# Patient Record
Sex: FEMALE | Race: WHITE | NOT HISPANIC OR LATINO | URBAN - METROPOLITAN AREA
[De-identification: names, ages, dates, MRNs, and addresses within clinical notes are randomized per-mention and may not be internally consistent; named-entity substitution may affect disease eponyms.]

---

## 2017-09-11 ENCOUNTER — OUTPATIENT (OUTPATIENT)
Dept: OUTPATIENT SERVICES | Facility: HOSPITAL | Age: 62
LOS: 1 days | End: 2017-09-11
Payer: COMMERCIAL

## 2017-09-11 VITALS
OXYGEN SATURATION: 100 % | TEMPERATURE: 98 F | RESPIRATION RATE: 16 BRPM | WEIGHT: 134.04 LBS | HEIGHT: 62.5 IN | HEART RATE: 63 BPM

## 2017-09-11 DIAGNOSIS — M20.12 HALLUX VALGUS (ACQUIRED), LEFT FOOT: ICD-10-CM

## 2017-09-11 DIAGNOSIS — Z01.818 ENCOUNTER FOR OTHER PREPROCEDURAL EXAMINATION: ICD-10-CM

## 2017-09-11 DIAGNOSIS — Z98.890 OTHER SPECIFIED POSTPROCEDURAL STATES: Chronic | ICD-10-CM

## 2017-09-11 DIAGNOSIS — G56.03 CARPAL TUNNEL SYNDROME, BILATERAL UPPER LIMBS: Chronic | ICD-10-CM

## 2017-09-11 PROCEDURE — 73630 X-RAY EXAM OF FOOT: CPT

## 2017-09-11 PROCEDURE — 73630 X-RAY EXAM OF FOOT: CPT | Mod: 26,LT

## 2017-09-11 PROCEDURE — G0463: CPT

## 2017-09-11 RX ORDER — SODIUM CHLORIDE 9 MG/ML
3 INJECTION INTRAMUSCULAR; INTRAVENOUS; SUBCUTANEOUS EVERY 8 HOURS
Qty: 0 | Refills: 0 | Status: DISCONTINUED | OUTPATIENT
Start: 2017-09-25 | End: 2017-09-25

## 2017-09-11 NOTE — H&P PST ADULT - NSANTHOSAYNRD_GEN_A_CORE
No. KAYLENE screening performed.  STOP BANG Legend: 0-2 = LOW Risk; 3-4 = INTERMEDIATE Risk; 5-8 = HIGH Risk

## 2017-09-11 NOTE — H&P PST ADULT - PROBLEM SELECTOR PLAN 1
Scheduled for left foot Roman Bunionectomy with Screw on 9/25/2017. Preoperative instructions discussed and given to patient. Verbalized understanding of instructions

## 2017-09-11 NOTE — H&P PST ADULT - MUSCULOSKELETAL COMMENTS
Left great toe bunion, non-inf lammed-appearing, left great toe deformity, mild erythema left great toe

## 2017-09-11 NOTE — H&P PST ADULT - NEGATIVE GENERAL SYMPTOMS
no chills/no sweating/no weight loss/no polydipsia/no polyuria/no malaise/no fatigue/no fever/no anorexia

## 2017-09-11 NOTE — H&P PST ADULT - PSH
None Carpal tunnel syndrome on both sides  s/p surgery in repair in 1994 and 1995  S/P bunionectomy  right foot  S/P shoulder surgery  right shoulder

## 2017-09-11 NOTE — H&P PST ADULT - ASSESSMENT
60 y/o female with PMH of heavy tobacco use, continuous diagnosed with hallux valgus, left foot and scheduled for left foot Roman Bunionectomy with Screw on 9/25/2017. Patient is at low risk for planned procedure

## 2017-09-11 NOTE — H&P PST ADULT - NEGATIVE GASTROINTESTINAL SYMPTOMS
no flatulence/no nausea/no diarrhea/no change in bowel habits/no vomiting/no constipation/no abdominal pain

## 2017-09-11 NOTE — H&P PST ADULT - FAMILY HISTORY
Father  Still living? No  Family history of oral cancer, Age at diagnosis: Age Unknown     Mother  Still living? No  Family history of myocardial infarction, Age at diagnosis: Age Unknown

## 2017-09-11 NOTE — H&P PST ADULT - RS GEN PE MLT RESP DETAILS PC
normal/no subcutaneous emphysema/good air movement/breath sounds equal/clear to auscultation bilaterally/no intercostal retractions/no rales/no wheezes/respirations non-labored/airway patent/no rhonchi/no chest wall tenderness

## 2017-09-11 NOTE — H&P PST ADULT - NEGATIVE CARDIOVASCULAR SYMPTOMS
no paroxysmal nocturnal dyspnea/no claudication/no chest pain/no orthopnea/no peripheral edema/no palpitations/no dyspnea on exertion

## 2017-09-11 NOTE — H&P PST ADULT - HISTORY OF PRESENT ILLNESS
62 y/o female with PMH of heavy tobacco use, continuous is here today for presurgical evaluation prior to surgery. She was diagnosed with hallux valgus, left foot and is scheduled for left foot Roman Bunionectomy with Screw on 9/25/2017

## 2017-09-25 ENCOUNTER — OUTPATIENT (OUTPATIENT)
Dept: OUTPATIENT SERVICES | Facility: HOSPITAL | Age: 62
LOS: 1 days | Discharge: HOME | End: 2017-09-25
Payer: COMMERCIAL

## 2017-09-25 VITALS
DIASTOLIC BLOOD PRESSURE: 68 MMHG | HEART RATE: 56 BPM | WEIGHT: 134.04 LBS | RESPIRATION RATE: 14 BRPM | HEIGHT: 62.5 IN | TEMPERATURE: 98 F | SYSTOLIC BLOOD PRESSURE: 123 MMHG | OXYGEN SATURATION: 100 %

## 2017-09-25 VITALS
HEART RATE: 55 BPM | SYSTOLIC BLOOD PRESSURE: 149 MMHG | DIASTOLIC BLOOD PRESSURE: 65 MMHG | OXYGEN SATURATION: 99 % | RESPIRATION RATE: 17 BRPM | TEMPERATURE: 98 F

## 2017-09-25 DIAGNOSIS — Z98.890 OTHER SPECIFIED POSTPROCEDURAL STATES: Chronic | ICD-10-CM

## 2017-09-25 DIAGNOSIS — Z01.818 ENCOUNTER FOR OTHER PREPROCEDURAL EXAMINATION: ICD-10-CM

## 2017-09-25 DIAGNOSIS — G56.03 CARPAL TUNNEL SYNDROME, BILATERAL UPPER LIMBS: Chronic | ICD-10-CM

## 2017-09-25 DIAGNOSIS — M20.12 HALLUX VALGUS (ACQUIRED), LEFT FOOT: ICD-10-CM

## 2017-09-25 PROCEDURE — 88300 SURGICAL PATH GROSS: CPT | Mod: 26

## 2017-09-25 PROCEDURE — 73630 X-RAY EXAM OF FOOT: CPT | Mod: 26,LT

## 2017-09-25 PROCEDURE — C1713: CPT

## 2017-09-25 PROCEDURE — 88300 SURGICAL PATH GROSS: CPT

## 2017-09-25 PROCEDURE — 28296 COR HLX VLGS DSTL MTAR OSTEO: CPT | Mod: LT

## 2017-09-25 PROCEDURE — 73630 X-RAY EXAM OF FOOT: CPT

## 2017-09-25 RX ORDER — AZITHROMYCIN 500 MG/1
1 TABLET, FILM COATED ORAL
Qty: 6 | Refills: 0 | OUTPATIENT
Start: 2017-09-25 | End: 2017-09-30

## 2017-09-25 RX ORDER — OXYCODONE AND ACETAMINOPHEN 5; 325 MG/1; MG/1
1 TABLET ORAL ONCE
Qty: 0 | Refills: 0 | Status: DISCONTINUED | OUTPATIENT
Start: 2017-09-25 | End: 2017-09-25

## 2017-09-25 RX ORDER — IBUPROFEN 200 MG
1 TABLET ORAL
Qty: 40 | Refills: 0 | OUTPATIENT
Start: 2017-09-25 | End: 2017-10-05

## 2017-09-25 RX ADMIN — SODIUM CHLORIDE 3 MILLILITER(S): 9 INJECTION INTRAMUSCULAR; INTRAVENOUS; SUBCUTANEOUS at 07:02

## 2017-09-25 NOTE — ASU DISCHARGE PLAN (ADULT/PEDIATRIC). - MEDICATION SUMMARY - MEDICATIONS TO TAKE
I will START or STAY ON the medications listed below when I get home from the hospital:    ibuprofen 800 mg oral tablet  -- 1 tab(s) by mouth every 6 hours MDD:please take no more than 4 tabs per day  -- Do not take this drug if you are pregnant.  It is very important that you take or use this exactly as directed.  Do not skip doses or discontinue unless directed by your doctor.  May cause drowsiness or dizziness.  Obtain medical advice before taking any non-prescription drugs as some may affect the action of this medication.  Take with food or milk.    -- Indication: For prn inflammation    Percocet 5/325 oral tablet  -- 1 tab(s) by mouth every 6 hours MDD:please take no more than 3 tabs per day  -- Caution federal law prohibits the transfer of this drug to any person other  than the person for whom it was prescribed.  May cause drowsiness.  Alcohol may intensify this effect.  Use care when operating dangerous machinery.  This prescription cannot be refilled.  This product contains acetaminophen.  Do not use  with any other product containing acetaminophen to prevent possible liver damage.  Using more of this medication than prescribed may cause serious breathing problems.    -- Indication: For prn pain    Azithromycin 5 Day Dose Pack 250 mg oral tablet  -- 1 tab(s) by mouth once a day MDD:take two tabs first day; 1 tab each day for the next 4 days  -- Do not take dairy products, antacids, or iron preparations within one hour of this medication.  Finish all this medication unless otherwise directed by prescriber.    -- Indication: For prophylactic abx

## 2017-09-25 NOTE — ASU DISCHARGE PLAN (ADULT/PEDIATRIC). - NOTIFY
Unable to Urinate/Bleeding that does not stop/Persistent Nausea and Vomiting/Pain not relieved by Medications/Swelling that continues/Fever greater than 101/Numbness, color, or temperature change to extremity/Numbness, tingling

## 2017-09-25 NOTE — ASU DISCHARGE PLAN (ADULT/PEDIATRIC). - CONDITIONS AT DISCHARGE
pt feels comfortable. denies pain. No active bleeding. Tolerating diet. Vital signs stable. Dressing dry, clean, and intact. Fully awake, alert and oriented x 4

## 2017-09-25 NOTE — BRIEF OPERATIVE NOTE - PROCEDURE
<<-----Click on this checkbox to enter Procedure Bunionectomy of left great toe  09/25/2017    Active  LTOMLINSON

## 2017-09-25 NOTE — ASU PATIENT PROFILE, ADULT - PSH
Carpal tunnel syndrome on both sides  s/p surgery in repair in 1994 and 1995  S/P bunionectomy  right foot  S/P shoulder surgery  right shoulder

## 2017-09-26 LAB — SURGICAL PATHOLOGY FINAL REPORT - CH: SIGNIFICANT CHANGE UP

## 2017-09-29 DIAGNOSIS — F17.210 NICOTINE DEPENDENCE, CIGARETTES, UNCOMPLICATED: ICD-10-CM

## 2017-09-29 DIAGNOSIS — M20.12 HALLUX VALGUS (ACQUIRED), LEFT FOOT: ICD-10-CM

## 2021-07-20 NOTE — ASU DISCHARGE PLAN (ADULT/PEDIATRIC). - =======================================================================
Received request via: Patient    Was the patient seen in the last year in this department? Yes    Does the patient have an active prescription (recently filled or refills available) for medication(s) requested? No    
Statement Selected

## 2021-12-28 NOTE — ASU PREOP CHECKLIST - SELECT TESTS ORDERED
Spine appears normal, range of motion is not limited, no muscle or joint tenderness
CBC/BMP/PT/PTT/INR/CXR/EKG